# Patient Record
Sex: FEMALE | Race: BLACK OR AFRICAN AMERICAN | NOT HISPANIC OR LATINO | Employment: FULL TIME | URBAN - METROPOLITAN AREA
[De-identification: names, ages, dates, MRNs, and addresses within clinical notes are randomized per-mention and may not be internally consistent; named-entity substitution may affect disease eponyms.]

---

## 2017-02-23 ENCOUNTER — OFFICE VISIT (OUTPATIENT)
Dept: ALLERGY | Facility: CLINIC | Age: 37
End: 2017-02-23
Payer: COMMERCIAL

## 2017-02-23 ENCOUNTER — LAB VISIT (OUTPATIENT)
Dept: LAB | Facility: HOSPITAL | Age: 37
End: 2017-02-23
Attending: ALLERGY & IMMUNOLOGY
Payer: COMMERCIAL

## 2017-02-23 VITALS
WEIGHT: 128.75 LBS | HEIGHT: 61 IN | BODY MASS INDEX: 24.31 KG/M2 | SYSTOLIC BLOOD PRESSURE: 100 MMHG | DIASTOLIC BLOOD PRESSURE: 72 MMHG | OXYGEN SATURATION: 98 %

## 2017-02-23 DIAGNOSIS — R13.13 PHARYNGEAL DYSPHAGIA: Chronic | ICD-10-CM

## 2017-02-23 DIAGNOSIS — Z91.018 ALLERGY TO OTHER FOODS: ICD-10-CM

## 2017-02-23 DIAGNOSIS — L50.0 ALLERGIC URTICARIA: ICD-10-CM

## 2017-02-23 DIAGNOSIS — L50.0 ALLERGIC URTICARIA: Chronic | ICD-10-CM

## 2017-02-23 DIAGNOSIS — Z91.018 ALLERGY TO OTHER FOODS: Primary | Chronic | ICD-10-CM

## 2017-02-23 PROCEDURE — 99204 OFFICE O/P NEW MOD 45 MIN: CPT | Mod: 25,S$GLB,, | Performed by: ALLERGY & IMMUNOLOGY

## 2017-02-23 PROCEDURE — 86003 ALLG SPEC IGE CRUDE XTRC EA: CPT

## 2017-02-23 PROCEDURE — 95004 PERQ TESTS W/ALRGNC XTRCS: CPT | Mod: S$GLB,,, | Performed by: ALLERGY & IMMUNOLOGY

## 2017-02-23 PROCEDURE — 99999 PR PBB SHADOW E&M-NEW PATIENT-LVL III: CPT | Mod: PBBFAC,,, | Performed by: ALLERGY & IMMUNOLOGY

## 2017-02-23 PROCEDURE — 36415 COLL VENOUS BLD VENIPUNCTURE: CPT | Mod: PO

## 2017-02-23 PROCEDURE — 1160F RVW MEDS BY RX/DR IN RCRD: CPT | Mod: S$GLB,,, | Performed by: ALLERGY & IMMUNOLOGY

## 2017-02-23 PROCEDURE — 86003 ALLG SPEC IGE CRUDE XTRC EA: CPT | Mod: 59

## 2017-02-23 RX ORDER — MINERAL OIL
180 ENEMA (ML) RECTAL DAILY
COMMUNITY

## 2017-02-23 RX ORDER — HYDROCORTISONE 25 MG/G
CREAM TOPICAL 2 TIMES DAILY
COMMUNITY
End: 2021-10-29

## 2017-02-23 RX ORDER — DIPHENHYDRAMINE HCL 25 MG
25 CAPSULE ORAL EVERY 6 HOURS PRN
COMMUNITY

## 2017-02-23 RX ORDER — EPINEPHRINE 0.3 MG/.3ML
1 INJECTION SUBCUTANEOUS ONCE
Qty: 2 DEVICE | Refills: 1 | Status: SHIPPED | OUTPATIENT
Start: 2017-02-23 | End: 2017-03-03 | Stop reason: SDUPTHER

## 2017-02-23 NOTE — LETTER
February 23, 2017      Giancarlo Archuleta, CHRISTOPHER  [address]  [place] [address]           [place] [facility]  [address] [facility]  [place] [address]  Phone: [phone]          Patient: Ronn Bae   MR Number: [id number]   YOB: 1980   Date of Visit: 2/23/2017       Dear Giancarlo Archuleta:    Thank you for referring Ronn Bae to me for evaluation. Attached you will find relevant portions of my assessment and plan of care.    If you have questions, please do not hesitate to call me. I look forward to following Ronn Bae along with you.    Sincerely,    Cami Villa MD    Enclosure  CC:  No Recipients    If you would like to receive this communication electronically, please contact [email or web address] or [phone] to request more information on [employer] access.    For providers and/or their staff who would like to refer a patient to [facility], please contact us through our one-stop-shop provider referral line, [facility], at [phone].    If you feel you have received this communication in error or would no longer like to receive these types of communications, please e-mail [email or web address]

## 2017-02-23 NOTE — LETTER
February 23, 2017        Giancarlo Archuleta NP  [address] [facility] [address]  [place] [address]             [place] [facility]  [address] [facility]  [place] [address]  Phone: [phone]   Patient: Ronn Bae   MR Number: [id number]   YOB: 1980   Date of Visit: 2/23/2017       Dear Giancarlo Archuleta NP:    I saw your patient today for an initial evaluation with respect to the following conditions:    1. 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  Crayfish, freshwater IgE    Shrimp IgE    Lobster IgE    Crab IgE   2. 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 Pharyngeal dysphagia related to crustacea ingestion         I have made the following changes in medications:    Patient Instructions   1.  The patient is to get allergen specific IgE levels to shrimp, crab, crawfish, and lobster.  She understands that when these results are available I will released him to the patient portal  2.  Patient is to eliminate crustacea including crab, shrimp, crawfish, and lobster from her diet.  3.  I prescribed EpiPen for emergency use in case of accidental ingestion.  She understands to use her EpiPen if she has trouble swallowing talking or breathing.  After EpiPen use she understands she should seek emergency care.  4.  Patient is to return if there are problems.      I have requested a follow-up visit as needed to assess this patient's progress.    I hope you find this information helpful in the care of your patient. If you have questions about the above, please do not hesitate to contact me.    Sincerely,    Cami Villa MD    [facility]

## 2017-02-24 NOTE — PATIENT INSTRUCTIONS
1.  The patient is to get allergen specific IgE levels to shrimp, crab, crawfish, and lobster.  She understands that when these results are available I will released him to the patient portal  2.  Patient is to eliminate crustacea including crab, shrimp, crawfish, and lobster from her diet.  3.  I prescribed EpiPen for emergency use in case of accidental ingestion.  She understands to use her EpiPen if she has trouble swallowing talking or breathing.  After EpiPen use she understands she should seek emergency care.  4.  Patient is to return if there are problems.

## 2017-02-24 NOTE — PROGRESS NOTES
Referring physician: Giancarlo Archuleta    Primary Care Physician: Primary Doctor No    Chief Complaint: No chief complaint on file.    Patient is new to me  Patient is not accompanied    Subjective:         EBONY    Ronn Bae is a 36 y.o. female here for an evaluation related to recent reaction related to the ingestion of crab cakes necessitating an urgent care visit.  Her most recent reaction occurred last week and started approximately 2 hours after the ingestion of a meal of crab cakes.  Patient states she to crab cakes.  After approximately 2 hours she began noticing she had hives on her face which spread down her neck onto her arms.  She also had some hives on her legs.  She next reported that her neck was feeling somewhat stiff and she felt that her throat was swollen.  She states that it was painful to swallow.  Was at that point that she went to urgent care.  She was treated at urgent care with an injection of Celestone and monitored.  She reported that within an hour or so her symptoms abated significantly and she was eventually released to home.    Patient states her first reaction to food was approximate 6 years ago when she ingested seasoned hog cracklins.  She developed some mild urticaria at this time; she thought it might be related to the seizing which was shrimp well.  Subsequently she relates that she'd had similar reactions to shrimp several times and to crawfish several times.  But confused her was the fact that between these episodes of reactions, that she would have shrimp or crawfish crab and not have any problems with the ingestion.  However this last episode was the most severe that she has experienced.    She reports that she's had in the past some seasonal nasal symptoms usually spring and fall over changes in the weather.  However she states that she has not had any of these problems for the past year.    Review of Systems  Constitutional: Negative for changes in appetite,  unintentional weight loss, fever, chills and fatigue.   HENT: Negative for facial pain, nose bleeds, nasal congestion, postnasal drip, throat clearing, sinus pressure, and  voice change. Negative for ear discharge, ear pain.  Positive for facial swelling, sore throat and trouble swallowing related to crab ingestion  Eyes: Negative for occular discharge, redness, itching and visual disturbance.  Respiratory: Negative for chest tightness, shortness of breath, wheezing, dyspnea on exertion, sputum production and cough.   Cardiovascular: Negative for chest pain, palpatations and leg swelling.  Gastrointestinal: Negative for abdominal distension, abdominal pain, constipation, diarrhea, nausea,and vomiting.   Genitourinary: Negative for difficulty urinating.   Musculoskeletal: Negative for arthralgias, gait problems, joint swelling, myalgias and back pain.   Neurological: Negative for dizziness, syncope, weakness, light-headedness, and headaches.   Hematological: Negative for adenopathy, does not bruise or bleed easily.  Psychiatric/Behavioral: Negative for agitation, anxiety, behavioral problems, confusion, and insomnia.  Skin: Urticaria and pruritus related to crab ingestion    PMH:  Reviewed with the patient and current for this visit  Past Skin Test results: Patient has never had skin testing  Past Immunotherapy: Patient has never been on immunotherapy    Family History:  Reviewed with the patient and current for this visit    Social:  Smoker: Nonsmoker  Occupation: Physical therapist    Environmental History:  Reviewed with the patient and current for this visit          Objective:          Physical Exam  General:patient is well developed and well nourished, in no acute distress.  Mental Status:  Alert, oriented and cooperative  Head and Face: normocephalic   Allergic shiners: No  Eyes:   Pupils: ERRLA: Scleral conjunctiva: clear; Cornea: clear; Palprebal conjunctiva: normal: Eyelid Skin: normal  Ears:Tympanic  membrane Left:normal light reflex; Right: normal light reflex; External canals normal  Nose:  Nares: patent; Mucosa :pink and moist pink; Nasal septum: midline;Turbinates are of normal size bilaterally and do not occlude the nasal airway.  Mouth/Pharynx: tonsils present; posterior pharyngeal wall normal; tongue normal; teeth normal; voice quality normal.  Neck:  Cervical lymph nodes: small, non-tender, freely moveable both anterior and posterior cervical chain; Trachea: midline; Masses: none  Lungs: Air movement is good; respiratory effort is good; no respiratory distress; breath sounds are vesicular in all lung fields; no wheezing; normal expiratory time; no cough.  Heart: regular rate and rhythm with mild respiratory variation; A1 and P2 are normal; no murmurs or gallops.  Abdomen:exam not done  Extremities: no cyanosis, clubbing or edema  Skin:no rashes or lesions present; skin hydrated and supple.    Skin Test Results: Patient has positive prick skin test to crab, shrimp, and lobster.  The positive histamine control and negative saline control indicates this is a valid assessment of ALLERGIC sensitivity.  :          Assessment:       1. 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  Crayfish, freshwater IgE    Shrimp IgE    Lobster IgE    Crab IgE   2. 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 Pharyngeal dysphagia related to crustacea ingestion             Plan:         Return for re-visit: Return if symptoms worsen or fail to improve.    Immunotherapy: No    Lab or X-ray: Yes.  Patient is to get allergen specific IgE levels for crab, crawfish, shrimp, and lobster.    Outside medical records requested: No        Patient Instructions   1.  The patient is to get allergen specific IgE levels to shrimp, crab, crawfish, and lobster.  She understands that when these results are available I will released him  to the patient portal  2.  Patient is to eliminate crustacea including crab, shrimp, crawfish, and lobster from her diet.  3.  I prescribed EpiPen for emergency use in case of accidental ingestion.  She understands to use her EpiPen if she has trouble swallowing talking or breathing.  After EpiPen use she understands she should seek emergency care.  4.  Patient is to return if there are problems.    Letter and copy of this visit sent to referring physician/PCP:            Cami Villa MD

## 2017-02-27 LAB
CRAB IGE QN: <0.35 KU/L
CRAWFISH IGE QN: <0.35 KU/L
DEPRECATED CRAB IGE RAST QL: NORMAL
DEPRECATED CRAWFISH IGE RAST QL: NORMAL
DEPRECATED LOBSTER IGE RAST QL: NORMAL
DEPRECATED SHRIMP IGE RAST QL: NORMAL
LOBSTER IGE QN: <0.35 KU/L
SHRIMP IGE QN: <0.35 KU/L

## 2017-03-04 RX ORDER — EPINEPHRINE 0.3 MG/.3ML
1 INJECTION SUBCUTANEOUS ONCE
Qty: 2 DEVICE | Refills: 1 | Status: SHIPPED | OUTPATIENT
Start: 2017-03-04 | End: 2021-10-29

## 2017-03-27 ENCOUNTER — PATIENT MESSAGE (OUTPATIENT)
Dept: ALLERGY | Facility: CLINIC | Age: 37
End: 2017-03-27

## 2017-06-13 ENCOUNTER — PATIENT MESSAGE (OUTPATIENT)
Dept: ALLERGY | Facility: CLINIC | Age: 37
End: 2017-06-13

## 2021-11-01 PROBLEM — D27.0 DERMOID CYST OF BOTH OVARIES: Status: ACTIVE | Noted: 2020-12-31

## 2021-11-01 PROBLEM — D25.9 LEIOMYOMA OF UTERUS: Status: ACTIVE | Noted: 2021-06-30

## 2021-11-01 PROBLEM — D27.1 DERMOID CYST OF BOTH OVARIES: Status: ACTIVE | Noted: 2020-12-31
